# Patient Record
Sex: MALE | Race: WHITE | NOT HISPANIC OR LATINO | URBAN - METROPOLITAN AREA
[De-identification: names, ages, dates, MRNs, and addresses within clinical notes are randomized per-mention and may not be internally consistent; named-entity substitution may affect disease eponyms.]

---

## 2021-03-11 NOTE — PATIENT DISCUSSION
PT WANTS TO PROCEED WITH VITRECTOMY OS THE FIRST WEEK OF APRIL DUE TO THAT BEING HER SPRING BREAK.   WILL REFER TO DR Reji Rader FOR CONSULT/PPV OS

## 2021-03-19 NOTE — PATIENT DISCUSSION
LETTER TO DR Pramod Paz: [address] [facility] WITH MAC ANESTHESIA. PLEASE FAX CLINICAL NOTE [phone].

## 2021-03-19 NOTE — PATIENT DISCUSSION
PATIENT DESIRES REMOVAL OF PERSISTENTLY SYMPTOMATIC VITREOUS FLOATERS SUBSEQUENT TO PVD. DISCUSSED RISKS/BENEFITS OF VITRECTOMY TO INCLUDE RISK OF RETINAL TEAR, RETINAL DETACHMENT, AND ENDOPHTHALMITIS. PATIENT OPTED TO PROCEED. SEE HARD COPY CONSENT.

## 2021-04-14 NOTE — PATIENT DISCUSSION
POST-OP DAY 1 EXAM: S/P PPV OS. Doing well today. Retina attached. IOP within acceptable limits. Start eyedrops in the surgical eye as instructed: Pred 4x/day, Cipro 4x/day, Atropine 2x/day. Take tylenol or ibuprofen for any mild eye pain or pressure. Retinal detachment and endophthalmitis precautions reviewed. Instructed to call immediately for worsening vision, eye pain, or eye discharge.

## 2021-04-14 NOTE — PATIENT DISCUSSION
Continue: prednisolone acetate (prednisolone acetate): drops,suspension: 1% 1 drop as directed opht 03-

## 2022-04-27 NOTE — PATIENT DISCUSSION
CONSIDER SURGERY: Visually and functionally significant. Discussed the r/b/a of surgery and pt wishes to wait at this time until after retinal clearance to then schedule with Dr. Dee Perea for a consult.

## 2022-07-01 NOTE — PATIENT DISCUSSION
CONSIDER SURGERY: Visually and functionally significant. Discussed the r/b/a of surgery and pt wishes to wait at this time until after retinal clearance to then schedule with Dr. Noel Wilson for a consult.

## 2022-07-15 NOTE — PATIENT DISCUSSION
Discussed IOL options of Standard OU VS  Custom Lensx OU. Patient elects Standard OU to proceed OS first OD later.

## 2022-07-26 NOTE — PATIENT DISCUSSION
The patient is very pleased with the result of our cataract surgery for the first eye and would like to schedule surgery for the second eye. The patient is complaining of vision problems in the other eye due to cataract and visual imbalance. Discussed risks, benefits and alternatives to cataract surgery.

## 2022-09-02 ENCOUNTER — CONSULTATION/EVALUATION (OUTPATIENT)
Dept: URBAN - METROPOLITAN AREA CLINIC 20 | Facility: CLINIC | Age: 37
End: 2022-09-02

## 2022-09-02 DIAGNOSIS — T15.02XA: ICD-10-CM

## 2022-09-02 PROCEDURE — 92002 INTRM OPH EXAM NEW PATIENT: CPT

## 2022-09-02 PROCEDURE — 65222 REMOVE FOREIGN BODY FROM EYE: CPT

## 2022-09-02 ASSESSMENT — VISUAL ACUITY: OD_CC: 20/20-2

## 2023-01-12 NOTE — PATIENT DISCUSSION
Continue: prednisolone acetate (prednisolone acetate): drops,suspension: 1% 1 drop as directed opht 03- Stelara Counseling:  I discussed with the patient the risks of ustekinumab including but not limited to immunosuppression, malignancy, posterior leukoencephalopathy syndrome, and serious infections.  The patient understands that monitoring is required including a PPD at baseline and must alert us or the primary physician if symptoms of infection or other concerning signs are noted.